# Patient Record
Sex: FEMALE | Race: WHITE | NOT HISPANIC OR LATINO
[De-identification: names, ages, dates, MRNs, and addresses within clinical notes are randomized per-mention and may not be internally consistent; named-entity substitution may affect disease eponyms.]

---

## 2021-06-15 ENCOUNTER — NON-APPOINTMENT (OUTPATIENT)
Age: 86
End: 2021-06-15

## 2021-06-15 ENCOUNTER — APPOINTMENT (OUTPATIENT)
Dept: ORTHOPEDIC SURGERY | Facility: CLINIC | Age: 86
End: 2021-06-15
Payer: MEDICARE

## 2021-06-15 VITALS
HEART RATE: 71 BPM | BODY MASS INDEX: 31.28 KG/M2 | SYSTOLIC BLOOD PRESSURE: 154 MMHG | WEIGHT: 170 LBS | HEIGHT: 62 IN | OXYGEN SATURATION: 98 % | DIASTOLIC BLOOD PRESSURE: 72 MMHG

## 2021-06-15 DIAGNOSIS — S60.511A ABRASION OF RIGHT HAND, INITIAL ENCOUNTER: ICD-10-CM

## 2021-06-15 DIAGNOSIS — M79.641 PAIN IN RIGHT HAND: ICD-10-CM

## 2021-06-15 DIAGNOSIS — S63.259A UNSPECIFIED DISLOCATION OF UNSPECIFIED FINGER, INITIAL ENCOUNTER: ICD-10-CM

## 2021-06-15 PROBLEM — Z00.00 ENCOUNTER FOR PREVENTIVE HEALTH EXAMINATION: Status: ACTIVE | Noted: 2021-06-15

## 2021-06-15 PROCEDURE — 99203 OFFICE O/P NEW LOW 30 MIN: CPT

## 2021-06-15 PROCEDURE — 73130 X-RAY EXAM OF HAND: CPT | Mod: RT

## 2021-06-15 NOTE — DISCUSSION/SUMMARY
[FreeTextEntry1] : The patient states she underwent a reduction following initial digital anesthesia for her right index finger.  She was informed she may have a fracture of the middle finger as well.  On today's examination there is no signs of instability or acute tenderness.  Patient was placed in a jose splint and the skin abrasion was redressed with Xeroform and Tegaderm.  The [place]

## 2021-06-15 NOTE — REASON FOR VISIT
[Initial Visit] : an initial visit for [Hand Pain] : hand pain [Wrist Pain] : wrist pain [Family Member] : family member

## 2021-06-15 NOTE — HISTORY OF PRESENT ILLNESS
[Right] : right hand dominant [FreeTextEntry1] : PT presents for initial evaluation with pain in her right finger ring pt is wearing a  finger splint to the right hand right ring finger, abrasion noted to the palm of her right hand, pt evidently fell and landed on her right hand in a sliding movement, pt was seen in [facility] 6/10/2021 xray taken and dx fx  and/or dislocation of phalanx of the finger?,  pt was given tramadol for pain and has applied ice, pt has ecchymosis and swelling to the right hand and wrist. Pt is taking Xarelto. Pt told to follow up with an orthopedist.

## 2021-06-15 NOTE — PHYSICAL EXAM
[de-identified] : Upon removal of a volar finger splint from the middle finger of the right hand there is noted fusiform swelling and ecchymosis to the digits and palmar aspect of the hand.  Active and passive range of motion is intact although somewhat limited due to soft tissue swelling no acute tenderness is noted to palpation overlying the entire index and middle fingers.  Superficial skin abrasion is noted to the region of the thenar crease [de-identified] : X-rays taken of the right hand in [facility] lateral and oblique projections failed to reveal evidence of any fractures or dislocations.